# Patient Record
Sex: MALE | Race: WHITE
[De-identification: names, ages, dates, MRNs, and addresses within clinical notes are randomized per-mention and may not be internally consistent; named-entity substitution may affect disease eponyms.]

---

## 2021-10-22 ENCOUNTER — HOSPITAL ENCOUNTER (EMERGENCY)
Dept: HOSPITAL 75 - ER | Age: 26
Discharge: ANOTHER HOSPITAL | End: 2021-10-22
Payer: COMMERCIAL

## 2021-10-22 VITALS
SYSTOLIC BLOOD PRESSURE: 130 MMHG | WEIGHT: 143.3 LBS | BODY MASS INDEX: 23.03 KG/M2 | DIASTOLIC BLOOD PRESSURE: 87 MMHG | HEIGHT: 66.02 IN

## 2021-10-22 DIAGNOSIS — S12.200A: Primary | ICD-10-CM

## 2021-10-22 DIAGNOSIS — S12.300A: ICD-10-CM

## 2021-10-22 DIAGNOSIS — I62.9: ICD-10-CM

## 2021-10-22 DIAGNOSIS — S12.400A: ICD-10-CM

## 2021-10-22 DIAGNOSIS — S00.03XA: ICD-10-CM
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
## 2021-10-22 LAB
ALBUMIN SERPL-MCNC: 4.3 GM/DL (ref 3.2–4.5)
ALP SERPL-CCNC: 71 U/L (ref 40–136)
ALT SERPL-CCNC: 65 U/L (ref 0–55)
APTT PPP: YELLOW S
BACTERIA #/AREA URNS HPF: NEGATIVE /HPF
BASOPHILS # BLD AUTO: 0.1 10^3/UL (ref 0–0.1)
BASOPHILS NFR BLD AUTO: 1 % (ref 0–10)
BILIRUB DIRECT SERPL-MCNC: 0.4 MG/DL (ref 0–0.3)
BILIRUB SERPL-MCNC: 1.2 MG/DL (ref 0.1–1)
BILIRUB UR QL STRIP: NEGATIVE
BUN/CREAT SERPL: 13
CALCIUM SERPL-MCNC: 9.1 MG/DL (ref 8.5–10.1)
CHLORIDE SERPL-SCNC: 106 MMOL/L (ref 98–107)
CO2 SERPL-SCNC: 22 MMOL/L (ref 21–32)
CREAT SERPL-MCNC: 0.78 MG/DL (ref 0.6–1.3)
EOSINOPHIL # BLD AUTO: 0.2 10^3/UL (ref 0–0.3)
EOSINOPHIL NFR BLD AUTO: 2 % (ref 0–10)
FIBRINOGEN PPP-MCNC: CLEAR MG/DL
GFR SERPLBLD BASED ON 1.73 SQ M-ARVRAT: 119 ML/MIN
GLUCOSE SERPL-MCNC: 110 MG/DL (ref 70–105)
GLUCOSE UR STRIP-MCNC: NEGATIVE MG/DL
HCT VFR BLD CALC: 40 % (ref 40–54)
HGB BLD-MCNC: 12.9 G/DL (ref 13.3–17.7)
HYALINE CASTS #/AREA URNS LPF: (no result) /LPF
INR PPP: 1.1 (ref 0.8–1.4)
KETONES UR QL STRIP: NEGATIVE
LEUKOCYTE ESTERASE UR QL STRIP: NEGATIVE
LYMPHOCYTES # BLD AUTO: 3.4 10^3/UL (ref 1–4)
LYMPHOCYTES NFR BLD AUTO: 30 % (ref 12–44)
MANUAL DIFFERENTIAL PERFORMED BLD QL: NO
MCH RBC QN AUTO: 30 PG (ref 25–34)
MCHC RBC AUTO-ENTMCNC: 33 G/DL (ref 32–36)
MCV RBC AUTO: 91 FL (ref 80–99)
MONOCYTES # BLD AUTO: 0.7 10^3/UL (ref 0–1)
MONOCYTES NFR BLD AUTO: 6 % (ref 0–12)
NEUTROPHILS # BLD AUTO: 6.6 10^3/UL (ref 1.8–7.8)
NEUTROPHILS NFR BLD AUTO: 58 % (ref 42–75)
NITRITE UR QL STRIP: NEGATIVE
PH UR STRIP: 7.5 [PH] (ref 5–9)
PLATELET # BLD: 373 10^3/UL (ref 130–400)
PMV BLD AUTO: 10.1 FL (ref 9–12.2)
POTASSIUM SERPL-SCNC: 3.3 MMOL/L (ref 3.6–5)
PROT SERPL-MCNC: 7.1 GM/DL (ref 6.4–8.2)
PROT UR QL STRIP: NEGATIVE
PROTHROMBIN TIME: 14.3 SEC (ref 12.2–14.7)
RBC #/AREA URNS HPF: (no result) /HPF
SODIUM SERPL-SCNC: 140 MMOL/L (ref 135–145)
SP GR UR STRIP: 1.01 (ref 1.02–1.02)
SQUAMOUS #/AREA URNS HPF: (no result) /HPF
WBC # BLD AUTO: 11.3 10^3/UL (ref 4.3–11)
WBC #/AREA URNS HPF: (no result) /HPF

## 2021-10-22 PROCEDURE — 85610 PROTHROMBIN TIME: CPT

## 2021-10-22 PROCEDURE — 99284 EMERGENCY DEPT VISIT MOD MDM: CPT

## 2021-10-22 PROCEDURE — 82947 ASSAY GLUCOSE BLOOD QUANT: CPT

## 2021-10-22 PROCEDURE — 81000 URINALYSIS NONAUTO W/SCOPE: CPT

## 2021-10-22 PROCEDURE — 29125 APPL SHORT ARM SPLINT STATIC: CPT

## 2021-10-22 PROCEDURE — 80320 DRUG SCREEN QUANTALCOHOLS: CPT

## 2021-10-22 PROCEDURE — 73110 X-RAY EXAM OF WRIST: CPT

## 2021-10-22 PROCEDURE — 72170 X-RAY EXAM OF PELVIS: CPT

## 2021-10-22 PROCEDURE — 80048 BASIC METABOLIC PNL TOTAL CA: CPT

## 2021-10-22 PROCEDURE — 71045 X-RAY EXAM CHEST 1 VIEW: CPT

## 2021-10-22 PROCEDURE — 93041 RHYTHM ECG TRACING: CPT

## 2021-10-22 PROCEDURE — 74177 CT ABD & PELVIS W/CONTRAST: CPT

## 2021-10-22 PROCEDURE — 86850 RBC ANTIBODY SCREEN: CPT

## 2021-10-22 PROCEDURE — 86900 BLOOD TYPING SEROLOGIC ABO: CPT

## 2021-10-22 PROCEDURE — 72125 CT NECK SPINE W/O DYE: CPT

## 2021-10-22 PROCEDURE — 70450 CT HEAD/BRAIN W/O DYE: CPT

## 2021-10-22 PROCEDURE — 80076 HEPATIC FUNCTION PANEL: CPT

## 2021-10-22 PROCEDURE — 73060 X-RAY EXAM OF HUMERUS: CPT

## 2021-10-22 PROCEDURE — 71260 CT THORAX DX C+: CPT

## 2021-10-22 PROCEDURE — 85025 COMPLETE CBC W/AUTO DIFF WBC: CPT

## 2021-10-22 PROCEDURE — 86901 BLOOD TYPING SEROLOGIC RH(D): CPT

## 2021-10-22 NOTE — ED GENERAL
General


Stated Complaint:  HEAD TRAUMA-FALL


Source of Information:  Patient


Exam Limitations:  No Limitations





History of Present Illness


Date Seen by Provider:  Oct 22, 2021


Time Seen by Provider:  14:50


Initial Comments


To ER by EMS with reports of fall.  He was up on the roof about 10 feet up when 

he fainted and fell off the roof landing on his head and wrist.  He complains of

pain to the head and neck.  EMS applied a cervical collar.


Timing/Duration:  1-2 Days


Severity:  Moderate





Allergies and Home Medications


Allergies


Coded Allergies:  


     No Allergy Information Available (Unverified , 10/22/21)





Patient Home Medication List


Home Medication List Reviewed:  Yes





Review of Systems


Review of Systems


Constitutional:  see HPI


EENTM:  see HPI


Respiratory:  no symptoms reported


Cardiovascular:  no symptoms reported


Genitourinary:  no symptoms reported


Musculoskeletal:  see HPI, neck pain


Skin:  no symptoms reported


Psychiatric/Neurological:  No Symptoms Reported


Hematologic/Lymphatic:  No Symptoms Reported


Immunological/Allergic:  no symptoms reported





Physical Exam


Vital Signs


Capillary Refill :


Height, Weight, BMI


Height: '"


Weight: lbs. oz. kg;  BMI


Method:


General Appearance:  No Apparent Distress, WD/WN, Other (There is a midline 

occipital scalp abrasion with surrounding hematoma.  A depressed skull fracture 

is possible.  There is no hemotympanum or silveira sign.  No epistaxis or evidence

of facial or globe injury.  His eyes open to verbal stimuli (3), he does not 

know the year or where he is at or what happened giving him a verbal score of 4,

motor function is 5.  This gives him GCS of 12.)


Eyes:  Bilateral Eye Normal Inspection, Bilateral Eye PERRL, Bilateral Eye EOMI


HEENT:  PERRL/EOMI, TMs Normal, Normal ENT Inspection


Neck:  Tender Lateral, Tender Midline, Other (In a rigid cervical collar)


Respiratory:  Chest Non Tender, Lungs Clear, Normal Breath Sounds, No Accessory 

Muscle Use, No Respiratory Distress


Cardiovascular:  Regular Rate, Rhythm, Normal Peripheral Pulses


Gastrointestinal:  Normal Bowel Sounds, Non Tender, Soft


Extremity:  Normal Capillary Refill, Normal Inspection, Other (Deformity to the 

right wrist)


Neurologic/Psychiatric:  Alert, Oriented x3


Skin:  Normal Color, Warm/Dry





Progress/Results/Core Measures


Suspected Sepsis


SIRS


Temperature: 


Pulse:  


Respiratory Rate: 


 


Laboratory Tests


10/22/21 14:27: White Blood Count 11.3H


Blood Pressure  / 


Mean: 


 


Laboratory Tests


10/22/21 14:27: 


Creatinine 0.78, INR Comment 1.1, Platelet Count 373, Total Bilirubin 1.2H








Results/Orders


Lab Results





Laboratory Tests








Test


 10/22/21


14:27 10/22/21


14:34 10/22/21


15:23 Range/Units


 


 


White Blood Count


 11.3 H


 


 


 4.3-11.0


10^3/uL


 


Red Blood Count


 4.36 


 


 


 4.30-5.52


10^6/uL


 


Hemoglobin 12.9 L   13.3-17.7  g/dL


 


Hematocrit 40    40-54  %


 


Mean Corpuscular Volume 91    80-99  fL


 


Mean Corpuscular Hemoglobin 30    25-34  pg


 


Mean Corpuscular Hemoglobin


Concent 33 


 


 


 32-36  g/dL





 


Red Cell Distribution Width 13.2    10.0-14.5  %


 


Platelet Count


 373 


 


 


 130-400


10^3/uL


 


Mean Platelet Volume 10.1    9.0-12.2  fL


 


Immature Granulocyte % (Auto) 2     %


 


Neutrophils (%) (Auto) 58    42-75  %


 


Lymphocytes (%) (Auto) 30    12-44  %


 


Monocytes (%) (Auto) 6    0-12  %


 


Eosinophils (%) (Auto) 2    0-10  %


 


Basophils (%) (Auto) 1    0-10  %


 


Neutrophils # (Auto)


 6.6 


 


 


 1.8-7.8


10^3/uL


 


Lymphocytes # (Auto)


 3.4 


 


 


 1.0-4.0


10^3/uL


 


Monocytes # (Auto)


 0.7 


 


 


 0.0-1.0


10^3/uL


 


Eosinophils # (Auto)


 0.2 


 


 


 0.0-0.3


10^3/uL


 


Basophils # (Auto)


 0.1 


 


 


 0.0-0.1


10^3/uL


 


Immature Granulocyte # (Auto)


 0.3 H


 


 


 0.0-0.1


10^3/uL


 


Prothrombin Time 14.3    12.2-14.7  SEC


 


INR Comment 1.1    0.8-1.4  


 


Sodium Level 140    135-145  MMOL/L


 


Potassium Level 3.3 L   3.6-5.0  MMOL/L


 


Chloride Level 106      MMOL/L


 


Carbon Dioxide Level 22    21-32  MMOL/L


 


Anion Gap 12    5-14  MMOL/L


 


Blood Urea Nitrogen 10    7-18  MG/DL


 


Creatinine


 0.78 


 


 


 0.60-1.30


MG/DL


 


Estimat Glomerular Filtration


Rate 119 


 


 


  





 


BUN/Creatinine Ratio 13     


 


Glucose Level 110 H     MG/DL


 


Calcium Level 9.1    8.5-10.1  MG/DL


 


Total Bilirubin 1.2 H   0.1-1.0  MG/DL


 


Direct Bilirubin 0.4 H   0.0-0.3  MG/DL


 


Indirect Bilirubin 0.8     MG/DL


 


Aspartate Amino Transf


(AST/SGOT) 42 H


 


 


 5-34  U/L





 


Alanine Aminotransferase


(ALT/SGPT) 65 H


 


 


 0-55  U/L





 


Alkaline Phosphatase 71      U/L


 


Total Protein 7.1    6.4-8.2  GM/DL


 


Albumin 4.3    3.2-4.5  GM/DL


 


Serum Alcohol < 10    <10  MG/DL


 


Glucometer  104     MG/DL


 


Urine Color   YELLOW   


 


Urine Clarity   CLEAR   


 


Urine pH   7.5  5-9  


 


Urine Specific Gravity   1.015 L 1.016-1.022  


 


Urine Protein   NEGATIVE  NEGATIVE  


 


Urine Glucose (UA)   NEGATIVE  NEGATIVE  


 


Urine Ketones   NEGATIVE  NEGATIVE  


 


Urine Nitrite   NEGATIVE  NEGATIVE  


 


Urine Bilirubin   NEGATIVE  NEGATIVE  


 


Urine Urobilinogen   0.2  < = 1.0  MG/DL


 


Urine Leukocyte Esterase   NEGATIVE  NEGATIVE  


 


Urine RBC (Auto)   NEGATIVE  NEGATIVE  


 


Urine RBC   NONE   /HPF


 


Urine WBC   RARE   /HPF


 


Urine Squamous Epithelial


Cells 


 


 RARE 


  /HPF





 


Urine Crystals   NONE   /LPF


 


Urine Bacteria   NEGATIVE   /HPF


 


Urine Casts   PRESENT   /LPF


 


Urine Hyaline Casts   RARE   /LPF


 


Urine Mucus   NEGATIVE   /LPF


 


Urine Culture Indicated   NO   








My Orders





Orders - PHIL REESE APRN


Cbc No Diff (10/22/21 14:35)


Basic Metabolic Panel (10/22/21 14:35)


Liver Panel (10/22/21 14:35)


Alcohol (10/22/21 14:35)


Ua Culture If Indicated (10/22/21 14:35)


Type And Screen (10/22/21 14:35)


Ct Head/Cervical Spine Wo (10/22/21 14:35)


Chest 1 View, Ap/Pa Only (10/22/21 14:35)


Pelvis (10/22/21 14:35)


End Tidal Co2 (10/22/21 14:35)


Monitor-Rhythm Ecg Trace Only (10/22/21 14:35)


Ed Iv/Invasive Line Start (10/22/21 14:35)


Ct Chest/Abdomen/Pelvis W (10/22/21 14:36)


Wrist, Right, 3 Views Or More (10/22/21 14:36)


Humerus, Right, 2 Views (10/22/21 14:36)


Iohexol Injection (Omnipaque 350 Mg/Ml 1 (10/22/21 15:15)


Received Contrast (Hold Metformin- Contr (10/22/21 15:15)


Sodium Chloride Flush (Catheter Flush Sy (10/22/21 15:15)


Ns (Ivpb) (Sodium Chloride 0.9% Ivpb Bag (10/22/21 15:15)


Fentanyl  Inj (Sublimaze Injection) (10/22/21 15:15)


Cbc With Automated Diff (10/22/21 15:50)


Protime With Inr (10/22/21 15:50)





Medications Given in ED





Current Medications








 Medications  Dose


 Ordered  Sig/Eloina


 Route  Start Time


 Stop Time Status Last Admin


Dose Admin


 


 Fentanyl Citrate  25 mcg  ONCE  ONCE


 IVP  10/22/21 15:15


 10/22/21 15:16 DC 10/22/21 15:31


25 MCG


 


 Iohexol  75 ml  ONCE  ONCE


 IV  10/22/21 15:15


 10/22/21 15:16 DC 10/22/21 15:06


75 ML


 


 Sodium Chloride  10 ml  AS NEEDED  PRN


 IV  10/22/21 15:15


    10/22/21 15:06


10 ML


 


 Sodium Chloride  100 ml  ONCE  ONCE


 IV  10/22/21 15:15


 10/22/21 15:16 DC 10/22/21 15:06


80 ML








Vital Signs/I&O


Capillary Refill :





Departure


Communication (Admissions)


[id number] I spoke with Dr. Giron from neurosurgery and Dr. Izquierdo from the 

emergency room at [facility] in [place] they both accepted the patient and 

midflight is here to transport.  He remains hemodynamically stable with a GCS of

12.


Family Conversation


NAME:   EDVIN CURTIS


[facility] REC#:   [id number]


ACCOUNT#:   [id number]


PT STATUS:   REG ER


:   1995


PHYSICIAN:   PHIL REESE


ADMIT DATE:   10/22/21/ER


                                   ***Draft***


Date of Exam:10/22/21





PELVIS








INDICATION: Trauma, fell from roof top. Pain.





EXAMINATION: Pelvis, 10/22/2021.





FINDINGS:





Two views of the pelvis.





There are no fractures or dislocations. The joint spaces appear


preserved. Soft tissues are grossly unremarkable.





IMPRESSION:


1. No acute osseous abnormality. Of note, portions of the


superolateral border of the right ilium not included on either


image.





  Dictated on workstation # [id number]








Dict:   10/22/21 1452


Trans:   10/22/21 145


 [id number]





Interpreted by:     SERA HOUSTON MD


Electronically signed by:


NAME:   EDVIN CURTIS


MED REC#:   [id number]


ACCOUNT#:   [id number]


PT STATUS:   REG ER


:   1995


PHYSICIAN:   PHIL REESE


ADMIT DATE:   10/22/21/ER


                                   ***Draft***


Date of Exam:10/22/21





CHEST 1 VIEW, AP/PA ONLY








INDICATION: Fall from a roof top.





TIME OF EXAM: 2:42 p.m.





COMPARISON: No prior studies are available for comparison.





FINDINGS: Heart size is normal. Lungs are clear. No pulmonary


contusion, effusion, or pneumothorax is detected.





IMPRESSION: No acute feature detected.





  Dictated on workstation # [id number]








Dict:   10/22/21 1453


Trans:   10/22/21 1455


 [id number]





Interpreted by:     SARAH KRUEGER MD


Electronically signed by:





Impression





   Primary Impression:  


   Intracranial hemorrhage


   Additional Impression:  


   Cervical spine fracture


Disposition:   XFER SHT-TRM HOSP


Condition:  Stable





Transfer


Transfer Reason:  Exceeds level of care


Time Spoke to Accepting Phy:  15:03











PHIL REESE             Oct 22, 2021 14:52

## 2021-10-22 NOTE — DIAGNOSTIC IMAGING REPORT
EXAMINATION: CT of the brain and CT cervical spine without

contrast, 10/22/2021.



TECHNIQUE: Multiple contiguous axial images were obtained through

the brain and cervical spine without the use of intravenous

contrast. Sagittal and coronal reformations through the cervical

spine were then performed. Auto Exposure Controls were utilized

during the CT exam to meet ALARA standards for radiation dose

reduction. 



INDICATION: Trauma, fell from a roof top straight onto top of

head. Complaining of pain in bilateral shoulders. Head pain.



FINDINGS:



BRAIN:



There is an extra-axial collection of hyperdensity overlying the

posterior left parietal and occipital lobes. Its shape is

suspicious for an epidural hematoma with subdural blood not

excluded. This extends proximally along the posterior aspect of

the parietal lobe measuring at least 4.4 cm in cranial to caudal

dimension with 0.9 cm in AP dimension. On the coronal

reconstructed imaging, there is mild hyperdensity overlying the

vertex of the parietal lobe just deep to the vertex of the

calvarium, which could represent a small amount of subdural blood

as well. Linear hyperdensity along the posterior falx and

tentorium is noted and could represent the normal dural

reflections. A small amount of subarachnoid blood is not

excluded.



There is no acute infarct. No mass, mass effect, or midline

shift. No hydrocephalus.



There is a large scalp hematoma posteriorly and right laterally

extending to the vertex with a large hematoma overlying the

vertex of the calvarium. There is an underlying nondisplaced,

nondepressed fracture which extends from the posterior vertex of

the skull left paracentrally and posteriorly to the skull base.

The paranasal sinuses demonstrate mucosal thickening with no

air-fluid levels appreciated. Mastoid air cells appear clear.



IMPRESSION:

1. Suspected epidural hematoma overlying the posterior left

parietal and occipital lobes with left lateral mild hyperdensity

towards the vertex, perhaps a focus of subdural blood. A

component of subarachnoid blood layering along the posterior

tentorium and falx not excluded. Short-term follow-up could

reevaluate.

2. Calvarial fracture as described extending to the skull base.



CT CERVICAL SPINE:



There is a predominantly vertical fracture through the inferior

endplate of C5 with a vague lucency noted extending through the

superior endplate as well. Loss of height of the vertebral body

is noted. There is no significant retropulsion into the canal.

Question mild loss of height also noted at C4 with a transverse

fracture through the left facet at C4 extending through the

inferior articulating facet. Diastasis of the fracture site is

noted measuring at least 5 mm. There is a nondisplaced fracture

through the posterior inferior endplate at C3 with a fracture

through the left inferior articulating facet with diastasis of at

least 4-5 mm. Question mild loss of height is noted at C6. MRI

could evaluate for edema. There is a suspected compression

deformity along the superior endplate at T3, incompletely imaged.

There is surrounding soft tissue prominence about the adjacent

vertebral body. There are no significant subluxations with

vertebral bodies fairly well aligned. The prevertebral soft

tissues are unremarkable. Visualized lung apices are clear. There

is air within the superior mediastinum most likely injected

during IV placement; correlate clinically.



IMPRESSION:

1. Fractures involving the C3, C4, and C5 levels as delineated

above with a possible nondisplaced compression fracture at C6 as

well. MRI could further evaluate for edema.

2. Incompletely imaged compression fracture at T3, which could be

further evaluated with MRI as well. Other incidental findings as

discussed above.



Findings called to nurse practitioner Tree Euceda by Dr. Sylvester

on 10/22/2021 at 3:11 p.m.



Dictated by: 



  Dictated on workstation # [id number]

## 2021-10-22 NOTE — DIAGNOSTIC IMAGING REPORT
PROCEDURE: CT chest, abdomen, and pelvis with contrast.



TECHNIQUE: Multiple contiguous axial images were obtained through

the chest, abdomen, and pelvis after the administration of

intravenous contrast. Auto Exposure Controls were utilized during

the CT exam to meet ALARA standards for radiation dose reduction.





INDICATION:  Trauma, fall off of a roof.



CT CHEST:



No definite mediastinal hematoma or great vessel injury is

identified. No pericardial or pleural fluid is detected. No

pulmonary contusion or pneumothorax is detected. Thoracic spine

shows normal curvature. There does appear to be some slight

superior endplate concavity involving the T3 and T4 vertebral

bodies with some associated minimal adjacent paraspinous soft

tissue. Features are consistent with a small superior end plate

fractures with minimal paraspinous hematoma. Remaining vertebrae

show normal stature. No other osseous abnormality is seen.



CT ABDOMEN AND PELVIS:



No focal liver or splenic laceration is seen. Gallbladder is

unremarkable. The pancreas, adrenal glands and kidneys are

unremarkable. Aorta is unremarkable. No definite free fluid or

evidence of hemoperitoneum is identified. The bladder is

unremarkable. Bowel loops are normal caliber. Lumbar spine shows

normal curvature and alignment. There is a limbus vertebrae at

L5, a chronic finding. No other osseous abnormality is seen.



IMPRESSION:

1. No soft tissue injury in the chest, abdomen or pelvis.

2. Features suggestive of a superior end plate fractures at T3

and T4 without evidence of retropulsion.



Dictated by: 



  Dictated on workstation # [id number]

## 2021-10-22 NOTE — DIAGNOSTIC IMAGING REPORT
INDICATION: Fall from a roof top.



TIME OF EXAM: 2:42 p.m.



COMPARISON: No prior studies are available for comparison.



FINDINGS: Heart size is normal. Lungs are clear. No pulmonary

contusion, effusion, or pneumothorax is detected.



IMPRESSION: No acute feature detected.



Dictated by: 



  Dictated on workstation # [id number]

## 2021-10-22 NOTE — DIAGNOSTIC IMAGING REPORT
INDICATION: Fall.



TIME OF EXAM: 2:56 PM



Multiple views of the right humerus were obtained. Alignment at

the shoulder and elbow appears normal. Humerus appears intact. No

fractures are seen.



IMPRESSION: No acute bony abnormality is detected.



Dictated by: 



  Dictated on workstation # [id number]

## 2021-10-22 NOTE — DIAGNOSTIC IMAGING REPORT
INDICATION: Trauma, fell from roof top. Pain.



EXAMINATION: Pelvis, 10/22/2021.



FINDINGS:



Two views of the pelvis.



There are no fractures or dislocations. The joint spaces appear

preserved. Soft tissues are grossly unremarkable.



IMPRESSION:

1. No acute osseous abnormality. Of note, portions of the

superolateral border of the right ilium not included on either

image.



Dictated by: 



  Dictated on workstation # [id number]

## 2021-10-22 NOTE — DIAGNOSTIC IMAGING REPORT
INDICATION: Fall.



TIME OF EXAM: 02:58 p.m.



FINDINGS: Three views of the right wrist demonstrate a distal

radius fracture with intra-articular extension. No significant

displacement or angulation is seen. There is also fracture of the

ulnar styloid. Carpus and metacarpals are intact.



IMPRESSION: Distal radius and ulnar fractures, as described.



Dictated by: 



  Dictated on workstation # [id number]